# Patient Record
Sex: FEMALE | Race: OTHER | URBAN - METROPOLITAN AREA
[De-identification: names, ages, dates, MRNs, and addresses within clinical notes are randomized per-mention and may not be internally consistent; named-entity substitution may affect disease eponyms.]

---

## 2019-05-02 PROBLEM — E66.01 SEVERE OBESITY (HCC): Status: ACTIVE | Noted: 2019-05-02

## 2019-05-30 ENCOUNTER — TELEPHONE (OUTPATIENT)
Dept: NUTRITION | Age: 29
End: 2019-05-30

## 2019-05-30 NOTE — TELEPHONE ENCOUNTER
Nutrition Counseling: Contacted ordering physician on 5/2/19 that this practice does not accept referral for patients with diagnosed eating disorders and patient was recommended for referral elsewhere. Will close referral for this office.     [address]  [phone]

## 2019-11-21 ENCOUNTER — HOSPITAL ENCOUNTER (OUTPATIENT)
Dept: NUTRITION | Age: 29
Discharge: HOME | End: 2019-11-21
Attending: FAMILY MEDICINE
Payer: COMMERCIAL

## 2019-11-21 PROCEDURE — 97802 MEDICAL NUTRITION INDIV IN: CPT

## 2019-11-21 NOTE — PROGRESS NOTES
NUTRITION ASSESSMENT    29YOF pt referred with diagnosis of Obesity. Did not bring nutrition assessment forms to appt. Has tried running at the gym, drinking water, eating salads, avoiding fat and oil, baking meat in the oven to lose weight and become frustrated when she doesn't lose anything, then will go back to eating whatever she wants. States she has always been overweight even brefore pregnancy. Lost 50lbs in 2 months by using [employer] TLC drops, but has gained it all back. C/o constipation- states she has tried fiber powder before and it doesn't help her. Observational NFPE unremarkable. Medical history remarkable for HLD. Anthropometrics:   Ht: 5'4 (162.5cm)   Wt:218# (98.9kg)-- MD Office visit  BMI: 37.42 (obesity class II)    Weight History:   Weight Metrics 2019   Weight 218 lb 207 lb 203 lb   BMI 37.42 kg/m2 35.53 kg/m2 34.84 kg/m2        Labs: T, LDL: 126  Medications/Supplements unremarkable    Food and Nutrient Intake:   Pt reports consuming 3 meals/day with snacks. Appears able to obtain appropriate nutritional choices as desired.  Diet Recall:  Breakfast: 8AM- celery, spinach, emili, water smoothie, coffee with almond milk 1Tbs brown sugar (never finishes it). 2 eggs with cooking spray, was eating whole grain bread but stopped eating it because she was worried it was causign weight gain. 10AM- apple  Lunch: 12-1PM chicken soup (chicken, onions, squash, , turkey, quesadilla with cheese . Something fast. Once in a while some rice. Beans with cheese.    3PM: Cereal with milk, peanuts, yogurt, fruit (zaida, watermelon)  Dinner: 6:30PM baked chicken with beans and rice (tries not to make rice often) OR salad (cucumber, tomatoes, griffin and salt)-no dressing; salad, rice, sometimes make [place] (stuffed pancake): tortilla with beans, cabbage, sauce, cheese; salmon with vegetables (carrots, chayote--NSV squash, ayote--NSV squash)      Snack: small bag of chips, cereal with almond milk, apple when she gets hungry in the afternoon  Beverages: no juice, no soda, just water. Shops at [employer]. 1x per month will eat hamburger or pizza. Eats out maybe once every 2 weeks.  Food Allergies/Intolerances: NKFA    Physical Activity: None    Sleep Habits: Unknown     Lifestyle/Cultural/Family Influence: , has 2 daughters     Motivation: Tired, doesn't have energy to play with girls, just not happy with her weight     Support: None voiced     Barriers: None voiced     Behaviors indicate current stage of change is: Preparation (Transtheoretical Model). Estimated Nutrition Needs:   EEN to maintain CBW (MSJ x 1.2): 2032 kcal/day  EEN for wt loss (-500 kcal/day, +/- 10% for margin of error): 2712-1305 kcal/day  Protein: 86-105g/day (25% EEN)  Carbohydrate: 138-169g/day (40% EEN)  Fat: 54-66g/day (35%EEN)  Fluid: 1.4-1.7L/day (1mL/kcal)     NUTRITION DIAGNOSIS:  Undesirable Food Choices related to food and nutrition related knowledge deficit as evidenced by diet recall, labs: , BMI: 37.4. NUTRITION INTERVENTIONS:  1. Appointment Length: 60 minutes  2. Nutrition Prescription:   Modification of Meals and Snacks (RD Goals for pt):   o Choose higher glycemic index/load carbohydrates  o Increase fiber intake (~ 20g/day)  o Consume 5 servings of nonstarchy vegetables, 2 servings of fruit/day  o Limit of 6oz animal protein/day  o Incorporate 1-2 servings of reduced fat dairy/day  o 2 servings of fatty fish/wk  o 4 servings of pulses/beans/nuts/seeds/legumes per wk  o Engage in 150 mins physical activity/wk  o Minimum 6hrs sleep/night    3. Nutrition Education:   Materials Provided and discussed:  o [employer] Healthy Eating Plate-[place]azi     Additional Content:  o MyPlate proportions, food sources within each food group, importance of macronutrient balance at meals and snacks.    o Demonstrated appropriate portions  o Answered questions re: rice to purchase  o Discussed reading labels for trans fats and whole grains  o Discussed importance of including healthy fat (ie salmon, avocado, EVOO) at meals   o Specifically encouraged increasing fiber intake via fruit, NSV, whole grains, beans to alleviate constipation and aid with fullness and weight loss  o Encouraged increasing protein portion at lunch and dinner to 3-4oz  o Berries at breakfast to pair with eggs to increase fiber intake  o Benefits of exercise   o Additional ways to prepare vegetables other than boiling     Application: Collaborated with pt to create sample meals/snacks for lunch and dinner. Pt verbalized understanding of recommendations discussed. Anticipate fair compliance with recommendations. 4.   Nutrition Counseling:   Motivational Interviewing:  o Reviewed motivations for change.  o Explained health benefits associated with recommended modifications as above.  Goal Setting: Collaborated with pt to set SMART goals. Stressed importance of realistic expectations. Goal: Pt will practice a healthful and sustainable eating pattern to achieve 1#/wk wt loss. Plan:           1. Make 1/2 of Plate NSV at lunch and dinner. 2. Choose 2 higher fiber CHO choices per meal.          3. Walk 30 mins, 3 days per week. NUTRITION MONITORING/EVALUATION:  Follow Up Appointment: Not set. Pt to call to set appt as desired. Pt would benefit from continued appointments with a Registered Dietitian until behavior change is maintained for at least 6 months. Frequency of follow up dependent upon any setbacks or need for support. Follow Up Monitoring Plans:   Monitor wt and diet recall for behavior change. Assess/address barriers to goals.  Topics for future appointments: balanced breakfasts and snacks, fluid intake.      Becky Arthur, RD, LD  Outpatient Dietitian  W: [phone]

## 2019-12-23 ENCOUNTER — DOCUMENTATION ONLY (OUTPATIENT)
Dept: NUTRITION | Age: 29
End: 2019-12-23

## 2019-12-23 NOTE — PROGRESS NOTES
Nutrition Counseling:  No further contact from pt to schedule an appt. Will close referral for this office.     Lencho Roberts, [address], [place]  Outpatient Dietitian  Office: [phone]

## 2019-12-30 ENCOUNTER — TELEPHONE (OUTPATIENT)
Dept: NUTRITION | Age: 29
End: 2019-12-30

## 2019-12-30 NOTE — TELEPHONE ENCOUNTER
Nutrition Counseling: Contacted pt regarding referral. See notes documented in Nutrition Counseling Referral for details. No further follow-up contact from pt. Will close referral for this office.     [address] [facility]  [phone]